# Patient Record
Sex: MALE | Race: WHITE | Employment: OTHER | URBAN - METROPOLITAN AREA
[De-identification: names, ages, dates, MRNs, and addresses within clinical notes are randomized per-mention and may not be internally consistent; named-entity substitution may affect disease eponyms.]

---

## 2021-02-14 ENCOUNTER — HOSPITAL ENCOUNTER (OUTPATIENT)
Age: 61
Discharge: HOME | End: 2021-02-14
Payer: COMMERCIAL

## 2021-02-14 VITALS
HEART RATE: 67 BPM | SYSTOLIC BLOOD PRESSURE: 152 MMHG | RESPIRATION RATE: 16 BRPM | TEMPERATURE: 98 F | DIASTOLIC BLOOD PRESSURE: 84 MMHG | OXYGEN SATURATION: 98 %

## 2021-02-14 DIAGNOSIS — Z20.822 ENCOUNTER FOR SCREENING LABORATORY TESTING FOR COVID-19 VIRUS: ICD-10-CM

## 2021-02-14 DIAGNOSIS — J01.10 ACUTE NON-RECURRENT FRONTAL SINUSITIS: Primary | ICD-10-CM

## 2021-02-14 LAB — SARS-COV-2 RNA RESP QL NAA+PROBE: NOT DETECTED

## 2021-02-14 PROCEDURE — 99202 OFFICE O/P NEW SF 15 MIN: CPT | Performed by: PHYSICIAN ASSISTANT

## 2021-02-14 PROCEDURE — U0002 COVID-19 LAB TEST NON-CDC: HCPCS | Performed by: PHYSICIAN ASSISTANT

## 2021-02-14 NOTE — ED PROVIDER NOTES
Patient Seen in: Immediate [facility]      History   Patient presents with:  Headache  Fever    Stated Complaint: Covid Testing- Head Pressure, Low Fever    HPI/Subjective:   HPI    72-year-old male presents to the [facility] for Covid testing.   Patient reports Conjunctiva/sclera: Conjunctivae normal.   Neck:      Musculoskeletal: Normal range of motion and neck supple. Cardiovascular:      Rate and Rhythm: Normal rate and regular rhythm.    Pulmonary:      Effort: Pulmonary effort is normal.      Breath so

## 2025-03-17 ENCOUNTER — OFFICE VISIT (OUTPATIENT)
Dept: FAMILY MEDICINE CLINIC | Facility: CLINIC | Age: 65
End: 2025-03-17
Payer: COMMERCIAL

## 2025-03-17 VITALS
OXYGEN SATURATION: 97 % | HEART RATE: 69 BPM | BODY MASS INDEX: 26.79 KG/M2 | DIASTOLIC BLOOD PRESSURE: 81 MMHG | TEMPERATURE: 97 F | HEIGHT: 76 IN | RESPIRATION RATE: 18 BRPM | SYSTOLIC BLOOD PRESSURE: 139 MMHG | WEIGHT: 220 LBS
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
## 2025-03-17 DIAGNOSIS — S30.861A TICK BITE OF ABDOMINAL WALL, INITIAL ENCOUNTER: Primary | ICD-10-CM

## 2025-03-17 PROCEDURE — 3079F DIAST BP 80-89 MM HG: CPT | Performed by: NURSE PRACTITIONER

## 2025-03-17 PROCEDURE — 3075F SYST BP GE 130 - 139MM HG: CPT | Performed by: NURSE PRACTITIONER

## 2025-03-17 PROCEDURE — 3008F BODY MASS INDEX DOCD: CPT | Performed by: NURSE PRACTITIONER

## 2025-03-17 PROCEDURE — 99203 OFFICE O/P NEW LOW 30 MIN: CPT | Performed by: NURSE PRACTITIONER

## 2025-03-17 RX ORDER — DOXYCYCLINE HYCLATE 100 MG
200 TABLET ORAL ONCE
Qty: 2 TABLET | Refills: 0 | Status: SHIPPED | OUTPATIENT
Start: 2025-03-17 | End: 2025-03-17

## 2025-03-18 NOTE — PATIENT INSTRUCTIONS
Rest. Keep area clean.  Doxycycline as prescribed.   Supportive care as discussed.   Follow up with PMD in 3-4 days for reeval. Follow up sooner or go to the emergency department immediately if symptoms worsen, change, you develop fever, headache, fatigue, joint stiffness, muscle aches and pains, swollen lymph nodes, neck pain or stiffness, muscle weakness on one or both sides of the face, pain, numbness or weakness in the hands or feet, painful swelling in tissues of the eye or eyelid, or if you have any concerns.

## 2025-03-18 NOTE — PROGRESS NOTES
CHIEF COMPLAINT:     Chief Complaint   Patient presents with    Insect Bite     Tick   Saturday stomach          HPI:     Jose Armando Love is a 64 year old male who presents with concerns of a tick bite.. Patient reports he was in the woods here in [place] on Saturday 3/15/25. Noted tick to right lower region of abdomen this morning and removed it.  Denies any itching, swelling, or  drainage. Denies fever, streaking of wound, or other signs of systemic illness. Tolerates PO well at home. No n/v/d. Denies any other aggravating or relieving factors at home. Denies any other treatment attempts prior to arrival.        Current Outpatient Medications   Medication Sig Dispense Refill    Doxycycline Hyclate 100 MG Oral Tab Take 2 tablets (200 mg total) by mouth one time for 1 dose. 2 tablet 0    BISOPROLOL FUMARATE OR Take by mouth.      OMEPRAZOLE by Does not apply route.      Loratadine (CLARITIN) 10 MG Oral Cap Take  by mouth.        Past Medical History:    Esophageal reflux    Essential hypertension    Sleep apnea      Social History:  Social History     Socioeconomic History    Marital status:    Tobacco Use    Smoking status: Never    Smokeless tobacco: Never        REVIEW OF SYSTEMS:   GENERAL: feels well otherwise, no fever, no chills.  SKIN: as above.  CHEST: no chest pains, no palpitations.  LUNGS: denies shortness of breath with exertion or rest. No wheezing, no cough.  LYMPH: No enlargement of the lymph nodes.  MUSC/SKEL: no joint swelling, no joint stiffness.  NEURO: no abnormal sensation, no tingling of the skin or numbness.    EXAM:   /81   Pulse 69   Temp 97.3 °F (36.3 °C)   Resp 18   Ht 6' 4\" (1.93 m)   Wt 220 lb (99.8 kg)   SpO2 97%   BMI 26.78 kg/m²   GENERAL: well developed, well nourished,in no apparent distress  SKIN: There is a singular lesion noted to right lower region of abdomen consistent with a tick bite. No fragment of the tick noted. Mild light erythema noted to lesion. No  swelling, tenderness to palpation, drainage or abnormal tactile warmth noted.  HEAD: atraumatic, normocephalic  EYES: conjunctiva clear,  NOSE: Normal external nose.  No rhinorrhea.  NECK: supple, non-tender  LUNGS: clear to auscultation bilaterally, no wheezes or rhonchi. Breathing is non labored.  CARDIO: RRR without murmur  EXTREMITIES: no cyanosis, clubbing or edema.  Cap refill brisk- less than 2 seconds.   LYMPH: No lymphadenopathy.      ASSESSMENT AND PLAN:       ICD-10-CM    1. Tick bite of abdominal wall, initial encounter  S30.861A     W57.XXXA         Tick Bite Prophylaxis Criteria:     -Suspicion of Ixodes Scapularis tick (deer tick): yes    -The estimated time of attachment is >=36 h based on the degree of tick engorgement with blood or likely time of exposure to the tick. yes    -Prophylaxis is started within 72hrs of removal: yes    -Berrelia burgdorferi prevalence in local ticks >20%/a highly endemic area: No      Discussed physical exam and hpi with pt. Pt has reassuring physical exam consistent with a tick bite that he removed prior to arrival. No signs of secondary infection noted at this time.. Treatment options discussed with patient and explained in detail. He does not meet 4/4 criteria for prophylaxis but would like to take the single dose prophylaxis of doxycyline anyway. Will start doxycyling along with supportive care. The risks, benefits and potential side effects of possible medications were reviewed. Alternatives were discussed. Monitoring parameters and expected course outlined. We reviewed pics of erythema migrans rash and symptoms that should prompt further evaluation. Patient to go to emergency department if symptoms fail to respond as outlined, or worsen in any way. The patient agreed with the plan.       Patient Instructions   Rest. Keep area clean.  Doxycycline as prescribed.   Supportive care as discussed.   Follow up with PMD in 3-4 days for reeval. Follow up sooner or go to the  emergency department immediately if symptoms worsen, change, you develop fever, headache, fatigue, joint stiffness, muscle aches and pains, swollen lymph nodes, neck pain or stiffness, muscle weakness on one or both sides of the face, pain, numbness or weakness in the hands or feet, painful swelling in tissues of the eye or eyelid, or if you have any concerns.